# Patient Record
Sex: MALE | Race: BLACK OR AFRICAN AMERICAN | NOT HISPANIC OR LATINO | URBAN - METROPOLITAN AREA
[De-identification: names, ages, dates, MRNs, and addresses within clinical notes are randomized per-mention and may not be internally consistent; named-entity substitution may affect disease eponyms.]

---

## 2020-12-16 ENCOUNTER — INPATIENT (INPATIENT)
Facility: HOSPITAL | Age: 40
LOS: 2 days | Discharge: HOME | End: 2020-12-19
Attending: STUDENT IN AN ORGANIZED HEALTH CARE EDUCATION/TRAINING PROGRAM | Admitting: STUDENT IN AN ORGANIZED HEALTH CARE EDUCATION/TRAINING PROGRAM
Payer: COMMERCIAL

## 2020-12-16 VITALS
RESPIRATION RATE: 17 BRPM | SYSTOLIC BLOOD PRESSURE: 128 MMHG | HEIGHT: 72 IN | DIASTOLIC BLOOD PRESSURE: 81 MMHG | WEIGHT: 220.02 LBS | TEMPERATURE: 99 F | HEART RATE: 86 BPM | OXYGEN SATURATION: 99 %

## 2020-12-16 DIAGNOSIS — Z98.890 OTHER SPECIFIED POSTPROCEDURAL STATES: Chronic | ICD-10-CM

## 2020-12-16 DIAGNOSIS — K50.00 CROHN'S DISEASE OF SMALL INTESTINE WITHOUT COMPLICATIONS: ICD-10-CM

## 2020-12-16 LAB
ALBUMIN SERPL ELPH-MCNC: 3.3 G/DL — SIGNIFICANT CHANGE UP (ref 3.3–5)
ALP SERPL-CCNC: 43 U/L — SIGNIFICANT CHANGE UP (ref 40–120)
ALT FLD-CCNC: 47 U/L — SIGNIFICANT CHANGE UP (ref 12–78)
ANION GAP SERPL CALC-SCNC: 9 MMOL/L — SIGNIFICANT CHANGE UP (ref 5–17)
APPEARANCE UR: CLEAR — SIGNIFICANT CHANGE UP
APTT BLD: 28.6 SEC — SIGNIFICANT CHANGE UP (ref 27.5–35.5)
AST SERPL-CCNC: 61 U/L — HIGH (ref 15–37)
BILIRUB SERPL-MCNC: 0.7 MG/DL — SIGNIFICANT CHANGE UP (ref 0.2–1.2)
BILIRUB UR-MCNC: NEGATIVE — SIGNIFICANT CHANGE UP
BLD GP AB SCN SERPL QL: SIGNIFICANT CHANGE UP
BUN SERPL-MCNC: 13 MG/DL — SIGNIFICANT CHANGE UP (ref 7–23)
CALCIUM SERPL-MCNC: 8.6 MG/DL — SIGNIFICANT CHANGE UP (ref 8.5–10.1)
CHLORIDE SERPL-SCNC: 99 MMOL/L — SIGNIFICANT CHANGE UP (ref 96–108)
CO2 SERPL-SCNC: 25 MMOL/L — SIGNIFICANT CHANGE UP (ref 22–31)
COLOR SPEC: YELLOW — SIGNIFICANT CHANGE UP
CREAT SERPL-MCNC: 1.88 MG/DL — HIGH (ref 0.5–1.3)
DIFF PNL FLD: ABNORMAL
EPI CELLS # UR: SIGNIFICANT CHANGE UP
FLUAV AG NPH QL: SIGNIFICANT CHANGE UP COUNTS
FLUBV AG NPH QL: SIGNIFICANT CHANGE UP COUNTS
GLUCOSE SERPL-MCNC: 129 MG/DL — HIGH (ref 70–99)
GLUCOSE UR QL: NEGATIVE MG/DL — SIGNIFICANT CHANGE UP
HCT VFR BLD CALC: 46.1 % — SIGNIFICANT CHANGE UP (ref 39–50)
HGB BLD-MCNC: 15.2 G/DL — SIGNIFICANT CHANGE UP (ref 13–17)
INR BLD: 1.05 RATIO — SIGNIFICANT CHANGE UP (ref 0.88–1.16)
KETONES UR-MCNC: NEGATIVE — SIGNIFICANT CHANGE UP
LACTATE SERPL-SCNC: 1.1 MMOL/L — SIGNIFICANT CHANGE UP (ref 0.7–2)
LACTATE SERPL-SCNC: 2.2 MMOL/L — HIGH (ref 0.7–2)
LEUKOCYTE ESTERASE UR-ACNC: NEGATIVE — SIGNIFICANT CHANGE UP
MCHC RBC-ENTMCNC: 27 PG — SIGNIFICANT CHANGE UP (ref 27–34)
MCHC RBC-ENTMCNC: 33 GM/DL — SIGNIFICANT CHANGE UP (ref 32–36)
MCV RBC AUTO: 82 FL — SIGNIFICANT CHANGE UP (ref 80–100)
NITRITE UR-MCNC: NEGATIVE — SIGNIFICANT CHANGE UP
NRBC # BLD: 0 /100 WBCS — SIGNIFICANT CHANGE UP (ref 0–0)
PH UR: 7 — SIGNIFICANT CHANGE UP (ref 5–8)
PLATELET # BLD AUTO: 126 K/UL — LOW (ref 150–400)
POTASSIUM SERPL-MCNC: 3.2 MMOL/L — LOW (ref 3.5–5.3)
POTASSIUM SERPL-SCNC: 3.2 MMOL/L — LOW (ref 3.5–5.3)
PROT SERPL-MCNC: 7.7 GM/DL — SIGNIFICANT CHANGE UP (ref 6–8.3)
PROT UR-MCNC: 30 MG/DL
PROTHROM AB SERPL-ACNC: 12.2 SEC — SIGNIFICANT CHANGE UP (ref 10.6–13.6)
RBC # BLD: 5.62 M/UL — SIGNIFICANT CHANGE UP (ref 4.2–5.8)
RBC # FLD: 13.9 % — SIGNIFICANT CHANGE UP (ref 10.3–14.5)
RBC CASTS # UR COMP ASSIST: SIGNIFICANT CHANGE UP /HPF (ref 0–4)
RSV RNA NPH QL NAA+NON-PROBE: SIGNIFICANT CHANGE UP COUNTS
SARS-COV-2 RNA SPEC QL NAA+PROBE: SIGNIFICANT CHANGE UP COUNTS
SODIUM SERPL-SCNC: 133 MMOL/L — LOW (ref 135–145)
SP GR SPEC: 1 — LOW (ref 1.01–1.02)
UROBILINOGEN FLD QL: NEGATIVE MG/DL — SIGNIFICANT CHANGE UP
WBC # BLD: 4.12 K/UL — SIGNIFICANT CHANGE UP (ref 3.8–10.5)
WBC # FLD AUTO: 4.12 K/UL — SIGNIFICANT CHANGE UP (ref 3.8–10.5)

## 2020-12-16 PROCEDURE — 99285 EMERGENCY DEPT VISIT HI MDM: CPT

## 2020-12-16 PROCEDURE — 99221 1ST HOSP IP/OBS SF/LOW 40: CPT

## 2020-12-16 PROCEDURE — 74177 CT ABD & PELVIS W/CONTRAST: CPT | Mod: 26,MA

## 2020-12-16 PROCEDURE — 93010 ELECTROCARDIOGRAM REPORT: CPT

## 2020-12-16 PROCEDURE — 71045 X-RAY EXAM CHEST 1 VIEW: CPT | Mod: 26

## 2020-12-16 RX ORDER — SODIUM CHLORIDE 9 MG/ML
1000 INJECTION INTRAMUSCULAR; INTRAVENOUS; SUBCUTANEOUS
Refills: 0 | Status: DISCONTINUED | OUTPATIENT
Start: 2020-12-16 | End: 2020-12-16

## 2020-12-16 RX ORDER — MORPHINE SULFATE 50 MG/1
4 CAPSULE, EXTENDED RELEASE ORAL ONCE
Refills: 0 | Status: DISCONTINUED | OUTPATIENT
Start: 2020-12-16 | End: 2020-12-16

## 2020-12-16 RX ORDER — HEPARIN SODIUM 5000 [USP'U]/ML
5000 INJECTION INTRAVENOUS; SUBCUTANEOUS EVERY 12 HOURS
Refills: 0 | Status: DISCONTINUED | OUTPATIENT
Start: 2020-12-16 | End: 2020-12-19

## 2020-12-16 RX ORDER — SODIUM CHLORIDE 9 MG/ML
1000 INJECTION INTRAMUSCULAR; INTRAVENOUS; SUBCUTANEOUS ONCE
Refills: 0 | Status: COMPLETED | OUTPATIENT
Start: 2020-12-16 | End: 2020-12-16

## 2020-12-16 RX ORDER — MORPHINE SULFATE 50 MG/1
2 CAPSULE, EXTENDED RELEASE ORAL EVERY 4 HOURS
Refills: 0 | Status: DISCONTINUED | OUTPATIENT
Start: 2020-12-16 | End: 2020-12-17

## 2020-12-16 RX ORDER — CIPROFLOXACIN LACTATE 400MG/40ML
400 VIAL (ML) INTRAVENOUS ONCE
Refills: 0 | Status: COMPLETED | OUTPATIENT
Start: 2020-12-16 | End: 2020-12-16

## 2020-12-16 RX ORDER — METRONIDAZOLE 500 MG
TABLET ORAL
Refills: 0 | Status: DISCONTINUED | OUTPATIENT
Start: 2020-12-16 | End: 2020-12-18

## 2020-12-16 RX ORDER — CIPROFLOXACIN LACTATE 400MG/40ML
400 VIAL (ML) INTRAVENOUS EVERY 12 HOURS
Refills: 0 | Status: DISCONTINUED | OUTPATIENT
Start: 2020-12-17 | End: 2020-12-18

## 2020-12-16 RX ORDER — ONDANSETRON 8 MG/1
4 TABLET, FILM COATED ORAL EVERY 6 HOURS
Refills: 0 | Status: DISCONTINUED | OUTPATIENT
Start: 2020-12-16 | End: 2020-12-19

## 2020-12-16 RX ORDER — PANTOPRAZOLE SODIUM 20 MG/1
40 TABLET, DELAYED RELEASE ORAL DAILY
Refills: 0 | Status: DISCONTINUED | OUTPATIENT
Start: 2020-12-16 | End: 2020-12-19

## 2020-12-16 RX ORDER — METRONIDAZOLE 500 MG
500 TABLET ORAL ONCE
Refills: 0 | Status: COMPLETED | OUTPATIENT
Start: 2020-12-16 | End: 2020-12-16

## 2020-12-16 RX ORDER — MORPHINE SULFATE 50 MG/1
2 CAPSULE, EXTENDED RELEASE ORAL ONCE
Refills: 0 | Status: DISCONTINUED | OUTPATIENT
Start: 2020-12-16 | End: 2020-12-16

## 2020-12-16 RX ORDER — POTASSIUM CHLORIDE 20 MEQ
40 PACKET (EA) ORAL ONCE
Refills: 0 | Status: COMPLETED | OUTPATIENT
Start: 2020-12-16 | End: 2020-12-16

## 2020-12-16 RX ORDER — METRONIDAZOLE 500 MG
500 TABLET ORAL EVERY 8 HOURS
Refills: 0 | Status: DISCONTINUED | OUTPATIENT
Start: 2020-12-16 | End: 2020-12-18

## 2020-12-16 RX ORDER — ACETAMINOPHEN 500 MG
650 TABLET ORAL EVERY 6 HOURS
Refills: 0 | Status: DISCONTINUED | OUTPATIENT
Start: 2020-12-16 | End: 2020-12-17

## 2020-12-16 RX ORDER — PIPERACILLIN AND TAZOBACTAM 4; .5 G/20ML; G/20ML
3.38 INJECTION, POWDER, LYOPHILIZED, FOR SOLUTION INTRAVENOUS ONCE
Refills: 0 | Status: DISCONTINUED | OUTPATIENT
Start: 2020-12-16 | End: 2020-12-16

## 2020-12-16 RX ORDER — CIPROFLOXACIN LACTATE 400MG/40ML
VIAL (ML) INTRAVENOUS
Refills: 0 | Status: DISCONTINUED | OUTPATIENT
Start: 2020-12-16 | End: 2020-12-18

## 2020-12-16 RX ORDER — SODIUM CHLORIDE 9 MG/ML
1000 INJECTION, SOLUTION INTRAVENOUS
Refills: 0 | Status: DISCONTINUED | OUTPATIENT
Start: 2020-12-16 | End: 2020-12-17

## 2020-12-16 RX ADMIN — Medication 100 MILLIGRAM(S): at 21:50

## 2020-12-16 RX ADMIN — Medication 650 MILLIGRAM(S): at 19:50

## 2020-12-16 RX ADMIN — Medication 100 MILLIGRAM(S): at 17:21

## 2020-12-16 RX ADMIN — MORPHINE SULFATE 2 MILLIGRAM(S): 50 CAPSULE, EXTENDED RELEASE ORAL at 16:00

## 2020-12-16 RX ADMIN — MORPHINE SULFATE 2 MILLIGRAM(S): 50 CAPSULE, EXTENDED RELEASE ORAL at 21:50

## 2020-12-16 RX ADMIN — SODIUM CHLORIDE 1000 MILLILITER(S): 9 INJECTION INTRAMUSCULAR; INTRAVENOUS; SUBCUTANEOUS at 13:14

## 2020-12-16 RX ADMIN — Medication 40 MILLIEQUIVALENT(S): at 17:16

## 2020-12-16 RX ADMIN — SODIUM CHLORIDE 1000 MILLILITER(S): 9 INJECTION INTRAMUSCULAR; INTRAVENOUS; SUBCUTANEOUS at 17:17

## 2020-12-16 RX ADMIN — MORPHINE SULFATE 2 MILLIGRAM(S): 50 CAPSULE, EXTENDED RELEASE ORAL at 17:17

## 2020-12-16 RX ADMIN — Medication 200 MILLIGRAM(S): at 18:55

## 2020-12-16 RX ADMIN — HEPARIN SODIUM 5000 UNIT(S): 5000 INJECTION INTRAVENOUS; SUBCUTANEOUS at 18:55

## 2020-12-16 NOTE — H&P ADULT - NSHPSOCIALHISTORY_GEN_ALL_CORE
Denies tobacco or recreational drug use.  Admits to social alcohol use.  Lives alone, has a girlfriend

## 2020-12-16 NOTE — ED PROVIDER NOTE - OBJECTIVE STATEMENT
40 year old male w/no pertinent PMH presents to the ED for abdominal pain and non-bloody diarrhea x2 days. Pt also w/nausea but no vomiting. Pt reports his pain started with periumbilical discomfort, now w/mild RLQ discomfort. Denies fever/chills, cough, SOB, CP, vomiting, dysuria or hematuria.

## 2020-12-16 NOTE — ED PROVIDER NOTE - PROGRESS NOTE DETAILS
CT with questionable colitis/appendicitis. d/w surgery will admit to floor for continued evlauation and treatment.

## 2020-12-16 NOTE — H&P ADULT - PROBLEM SELECTOR PLAN 2
R/o appendicitis, will continue to monitor closely with serial abd exams  Continue IV ABX (Cipro/Flagyl)  Monitor for temps

## 2020-12-16 NOTE — H&P ADULT - PROBLEM SELECTOR PLAN 1
Admit to Dr. Lombardo, Surgery  NPO/IVF  IV fluid resuscitation, rpt lactate   Strict I/Os, monitor and record all urine output  Cipro/Flagyl IV  F/u GI consult (Discussed with Dr. Hong who will see pt in AM)  Monitor for temps, antipyretics PRN  Serial abd exams  Analgesia PRN, antiemetic PRN  DVT ppx, GI ppx  F/u AM labs, ESR, CRP  Replete lytes PRN  Discussed with Dr. Lombardo

## 2020-12-16 NOTE — H&P ADULT - NEGATIVE GENERAL GENITOURINARY SYMPTOMS
no hematuria/no flank pain L/no flank pain R/no bladder infections/no dysuria/normal urinary frequency

## 2020-12-16 NOTE — ED ADULT NURSE NOTE - OBJECTIVE STATEMENT
pt A&Ox4 with  co/ right lower abdominal pain and diarrhea x2 days. Denies n/v, SOB, Denies chest pain. Denies fevers.  Denies PMH

## 2020-12-16 NOTE — H&P ADULT - NSHPPHYSICALEXAM_GEN_ALL_CORE
General: Appears stated age, No acute distress, WD/WN  Head: NC/AT  EENT: PERRLA. EOMI. Conjunctiva and sclera clear. Pharynx clear.  Neck: Supple.  Lungs: CTA B/l. Nonlabored Respirations  CV: +S1S2, RRR  Abdomen: Healed laparoscopic scars noted. Nondistended, +BS, Soft, +moderate RLQ tenderness to palpation with rebound tenderness, no guarding  Extremities: Warm and well perfused. 2+ peripheral pulses b/l. Calf soft, nontender b/l. No pedal edema.  Neuro: A&Ox3.

## 2020-12-16 NOTE — H&P ADULT - HISTORY OF PRESENT ILLNESS
41yo Male with no sig. PMH and h/o Laparoscopic Umbilical hernia repair with mesh (3yrs ago) presents to the ER c/o abdominal pain and diarrhea x3 days. Patient states RLQ abdominal pain and diarrhea began 3 days ago after eating a medium-rare steak and drinking "more alcohol than the usual" the night before. States pain has been severe and intermittent since it started, non radiating, rated as 9/10 in severity at its worst, feels better when he lays down, has not taken anything for pain at home. Associated with nausea, and anorexia, denies vomiting. Has been drinking Gatorade x3 days, unable to eat. Also c/o watery nonbloody diarrhea x3 days (about 15-20 episodes/day). Denies h/o similar incident, states he thought he had food poisoning, but pain and diarrhea has not improved prompting his arrival to the ER. States he took a COVID test which was negative.   Denies fevers, chest pain, sob, dizziness, dysuria, h/o constipation, Crohn's or UC.

## 2020-12-16 NOTE — ED PROVIDER NOTE - CLINICAL SUMMARY MEDICAL DECISION MAKING FREE TEXT BOX
Will evaluate for appendicitis and other causes of intraabdominal pathology with labs, urine ant CT abdomen/pelvis pt declining pain medication at this time.

## 2020-12-17 LAB
ANION GAP SERPL CALC-SCNC: 8 MMOL/L — SIGNIFICANT CHANGE UP (ref 5–17)
BUN SERPL-MCNC: 12 MG/DL — SIGNIFICANT CHANGE UP (ref 7–23)
CALCIUM SERPL-MCNC: 8.4 MG/DL — LOW (ref 8.5–10.1)
CHLORIDE SERPL-SCNC: 102 MMOL/L — SIGNIFICANT CHANGE UP (ref 96–108)
CO2 SERPL-SCNC: 27 MMOL/L — SIGNIFICANT CHANGE UP (ref 22–31)
CREAT SERPL-MCNC: 1.66 MG/DL — HIGH (ref 0.5–1.3)
CRP SERPL-MCNC: 21.64 MG/DL — HIGH (ref 0–0.4)
ERYTHROCYTE [SEDIMENTATION RATE] IN BLOOD: 17 MM/HR — HIGH (ref 0–15)
GLUCOSE SERPL-MCNC: 95 MG/DL — SIGNIFICANT CHANGE UP (ref 70–99)
HCT VFR BLD CALC: 44.3 % — SIGNIFICANT CHANGE UP (ref 39–50)
HGB BLD-MCNC: 14.7 G/DL — SIGNIFICANT CHANGE UP (ref 13–17)
MAGNESIUM SERPL-MCNC: 1.8 MG/DL — SIGNIFICANT CHANGE UP (ref 1.6–2.6)
MCHC RBC-ENTMCNC: 27.2 PG — SIGNIFICANT CHANGE UP (ref 27–34)
MCHC RBC-ENTMCNC: 33.2 GM/DL — SIGNIFICANT CHANGE UP (ref 32–36)
MCV RBC AUTO: 82 FL — SIGNIFICANT CHANGE UP (ref 80–100)
NRBC # BLD: 0 /100 WBCS — SIGNIFICANT CHANGE UP (ref 0–0)
PHOSPHATE SERPL-MCNC: 2.6 MG/DL — SIGNIFICANT CHANGE UP (ref 2.5–4.5)
PLATELET # BLD AUTO: 134 K/UL — LOW (ref 150–400)
POTASSIUM SERPL-MCNC: 3.4 MMOL/L — LOW (ref 3.5–5.3)
POTASSIUM SERPL-SCNC: 3.4 MMOL/L — LOW (ref 3.5–5.3)
RBC # BLD: 5.4 M/UL — SIGNIFICANT CHANGE UP (ref 4.2–5.8)
RBC # FLD: 14.1 % — SIGNIFICANT CHANGE UP (ref 10.3–14.5)
SARS-COV-2 IGG SERPL QL IA: NEGATIVE — SIGNIFICANT CHANGE UP
SARS-COV-2 IGM SERPL IA-ACNC: <0.1 INDEX — SIGNIFICANT CHANGE UP
SODIUM SERPL-SCNC: 137 MMOL/L — SIGNIFICANT CHANGE UP (ref 135–145)
WBC # BLD: 3.71 K/UL — LOW (ref 3.8–10.5)
WBC # FLD AUTO: 3.71 K/UL — LOW (ref 3.8–10.5)

## 2020-12-17 RX ORDER — DEXTROSE MONOHYDRATE, SODIUM CHLORIDE, AND POTASSIUM CHLORIDE 50; .745; 4.5 G/1000ML; G/1000ML; G/1000ML
1000 INJECTION, SOLUTION INTRAVENOUS
Refills: 0 | Status: DISCONTINUED | OUTPATIENT
Start: 2020-12-17 | End: 2020-12-19

## 2020-12-17 RX ORDER — POTASSIUM CHLORIDE 20 MEQ
10 PACKET (EA) ORAL
Refills: 0 | Status: COMPLETED | OUTPATIENT
Start: 2020-12-17 | End: 2020-12-17

## 2020-12-17 RX ORDER — ACETAMINOPHEN 500 MG
650 TABLET ORAL EVERY 6 HOURS
Refills: 0 | Status: DISCONTINUED | OUTPATIENT
Start: 2020-12-17 | End: 2020-12-19

## 2020-12-17 RX ORDER — ACETAMINOPHEN 500 MG
1000 TABLET ORAL ONCE
Refills: 0 | Status: COMPLETED | OUTPATIENT
Start: 2020-12-17 | End: 2020-12-17

## 2020-12-17 RX ADMIN — Medication 1000 MILLIGRAM(S): at 21:39

## 2020-12-17 RX ADMIN — DEXTROSE MONOHYDRATE, SODIUM CHLORIDE, AND POTASSIUM CHLORIDE 75 MILLILITER(S): 50; .745; 4.5 INJECTION, SOLUTION INTRAVENOUS at 13:14

## 2020-12-17 RX ADMIN — Medication 100 MILLIGRAM(S): at 21:09

## 2020-12-17 RX ADMIN — MORPHINE SULFATE 2 MILLIGRAM(S): 50 CAPSULE, EXTENDED RELEASE ORAL at 00:26

## 2020-12-17 RX ADMIN — Medication 100 MILLIGRAM(S): at 13:14

## 2020-12-17 RX ADMIN — Medication 100 MILLIGRAM(S): at 05:08

## 2020-12-17 RX ADMIN — Medication 100 MILLIEQUIVALENT(S): at 08:54

## 2020-12-17 RX ADMIN — MORPHINE SULFATE 2 MILLIGRAM(S): 50 CAPSULE, EXTENDED RELEASE ORAL at 14:45

## 2020-12-17 RX ADMIN — Medication 400 MILLIGRAM(S): at 21:09

## 2020-12-17 RX ADMIN — Medication 650 MILLIGRAM(S): at 05:32

## 2020-12-17 RX ADMIN — SODIUM CHLORIDE 125 MILLILITER(S): 9 INJECTION, SOLUTION INTRAVENOUS at 05:08

## 2020-12-17 RX ADMIN — PANTOPRAZOLE SODIUM 40 MILLIGRAM(S): 20 TABLET, DELAYED RELEASE ORAL at 11:05

## 2020-12-17 RX ADMIN — Medication 200 MILLIGRAM(S): at 17:01

## 2020-12-17 RX ADMIN — MORPHINE SULFATE 2 MILLIGRAM(S): 50 CAPSULE, EXTENDED RELEASE ORAL at 14:25

## 2020-12-17 RX ADMIN — Medication 100 MILLIEQUIVALENT(S): at 10:01

## 2020-12-17 RX ADMIN — Medication 200 MILLIGRAM(S): at 05:07

## 2020-12-17 RX ADMIN — Medication 100 MILLIEQUIVALENT(S): at 08:05

## 2020-12-17 RX ADMIN — MORPHINE SULFATE 2 MILLIGRAM(S): 50 CAPSULE, EXTENDED RELEASE ORAL at 05:07

## 2020-12-17 RX ADMIN — HEPARIN SODIUM 5000 UNIT(S): 5000 INJECTION INTRAVENOUS; SUBCUTANEOUS at 17:01

## 2020-12-17 NOTE — CONSULT NOTE ADULT - SUBJECTIVE AND OBJECTIVE BOX
HPI:  39yo Male with no sig. PMH and h/o Laparoscopic Umbilical hernia repair with mesh (3yrs ago) presents to the ER c/o abdominal pain and diarrhea x3 days. Patient states RLQ abdominal pain and diarrhea began 3 days ago after eating a medium-rare steak and drinking "more alcohol than the usual" the night before. States pain has been severe and intermittent since it started, non radiating, rated as 9/10 in severity at its worst, feels better when he lays down, has not taken anything for pain at home. Associated with nausea, and anorexia, denies vomiting. Has been drinking Gatorade x3 days, unable to eat. Also c/o watery nonbloody diarrhea x3 days (about 15-20 episodes/day). Denies h/o similar incident, states he thought he had food poisoning, but pain and diarrhea has not improved prompting his arrival to the ER. States he took a COVID test which was negative.   Denies fevers, chest pain, sob, dizziness, dysuria, h/o constipation, Crohn's or UC.  (16 Dec 2020 17:09)  --------------- As Above -------------  The patient was doing well until Monday ~ 1AM when he developed diarrhea. It worsened and he then developed RLQ pain. Symptoms worsened and he came to the ER. Symptoms started abruptly. Was in [place] 2 weeks ago. No other friends / family members with above symptoms. No family history of IBDNo recent antibiotics.  Prior to Monday,  the patient denies melena, hematochezia, hematemesis, nausea, vomiting, abdominal pain, constipation, diarrhea, or change in bowel movements   Feeling slightly better this AM. Diarrhea unchanged / pain better. febrile (+)  See CT / labs      PAST MEDICAL & SURGICAL HISTORY:  No pertinent past medical history    History of umbilical hernia repair  Laparoscopic, with mesh        MEDICATIONS  (STANDING):  ciprofloxacin   IVPB 400 milliGRAM(s) IV Intermittent every 12 hours  ciprofloxacin   IVPB      dextrose 5% + sodium chloride 0.45% with potassium chloride 20 mEq/L 1000 milliLiter(s) (75 mL/Hr) IV Continuous <Continuous>  heparin   Injectable 5000 Unit(s) SubCutaneous every 12 hours  metroNIDAZOLE  IVPB      metroNIDAZOLE  IVPB 500 milliGRAM(s) IV Intermittent every 8 hours  pantoprazole  Injectable 40 milliGRAM(s) IV Push daily    MEDICATIONS  (PRN):  acetaminophen   Tablet .. 650 milliGRAM(s) Oral every 6 hours PRN Temp greater or equal to 38C (100.4F), Mild Pain (1 - 3)  morphine  - Injectable 2 milliGRAM(s) IV Push every 4 hours PRN Severe Pain (7 - 10)  ondansetron Injectable 4 milliGRAM(s) IV Push every 6 hours PRN Nausea and/or Vomiting      Allergies    No Known Allergies    Intolerances        FAMILY HISTORY:  No pertinent family history in first degree relatives        REVIEW OF SYSTEMS:    CONSTITUTIONAL: No fever, weight loss, or fatigue  EYES: No eye pain, visual disturbances, or discharge  ENMT:  No difficulty hearing, tinnitus, vertigo; No sinus or throat pain  NECK: No pain or stiffness  BREASTS: No pain, masses, or nipple discharge  RESPIRATORY: No cough, wheezing, chills or hemoptysis; No shortness of breath  CARDIOVASCULAR: No chest pain, palpitations, dizziness, or leg swelling  GASTROINTESTINAL: See above  GENITOURINARY: No dysuria, frequency, hematuria, or incontinence  NEUROLOGICAL: No headaches, memory loss, loss of strength, numbness, or tremors  SKIN: No itching, burning, rashes, or lesions   LYMPH NODES: No enlarged glands  ENDOCRINE: No heat or cold intolerance; No hair loss  MUSCULOSKELETAL: No joint pain or swelling; No muscle, back, or extremity pain  PSYCHIATRIC: No depression, anxiety, mood swings, or difficulty sleeping  HEME/LYMPH: No easy bruising, or bleeding gums  ALLERGY AND IMMUNOLOGIC: No hives or eczema          SOCIAL HISTORY:    FAMILY HISTORY:  No pertinent family history in first degree relatives        Vital Signs Last 24 Hrs  T(C): 36.9 (17 Dec 2020 11:44), Max: 38.4 (16 Dec 2020 19:49)  T(F): 98.5 (17 Dec 2020 11:44), Max: 101.2 (16 Dec 2020 19:49)  HR: 59 (17 Dec 2020 11:44) (58 - 100)  BP: 111/62 (17 Dec 2020 11:44) (107/64 - 117/57)  BP(mean): --  RR: 18 (17 Dec 2020 11:44) (17 - 18)  SpO2: 98% (17 Dec 2020 11:44) (96% - 99%)    PHYSICAL EXAM:    GENERAL: NAD, well-groomed, well-developed  HEAD:  Atraumatic, Normocephalic  EYES: EOMI, PERRLA, conjunctiva and sclera clear  NECK: Supple, No JVD, Normal thyroid  NERVOUS SYSTEM:  Alert & Oriented X3, Good concentration; Motor Strength 5/5 B/L upper and lower extremities;   CHEST/LUNG: Clear to percussion bilaterally; No rales, rhonchi, wheezing, or rubs  HEART: Regular rate and rhythm; No murmurs, rubs, or gallops  ABDOMEN: Soft, Nontender, Nondistended; Bowel sounds present  EXTREMITIES:  2+ Peripheral Pulses, No clubbing, cyanosis, or edema  LYMPH: No lymphadenopathy noted   RECTAL:  Deferred   SKIN: No rashes or lesions    LABS:                        14.7   3.71  )-----------( 134      ( 17 Dec 2020 04:36 )             44.3       CBC:  - @ 04:36  WBC  3.71  HGB 14.7  HCT 44.3 Plate 134  MCV 82.0  12- @ 13:10  WBC  4.12  HGB 15.2  HCT 46.1 Plate 126  MCV 82.0           17 Dec 2020 04:36    137    |  102    |  12     ----------------------------<  95     3.4     |  27     |  1.66   16 Dec 2020 13:10    133    |  99     |  13     ----------------------------<  129    3.2     |  25     |  1.88     Ca    8.4        17 Dec 2020 04:36  Ca    8.6        16 Dec 2020 13:10  Phos  2.6       17 Dec 2020 04:36  Mg     1.8       17 Dec 2020 04:36    TPro  7.7    /  Alb  3.3    /  TBili  0.7    /  DBili  x      /  AST  61     /  ALT  47     /  AlkPhos  43     16 Dec 2020 13:10    PT/INR - ( 16 Dec 2020 13:10 )   PT: 12.2 sec;   INR: 1.05 ratio         PTT - ( 16 Dec 2020 13:10 )  PTT:28.6 sec  Urinalysis Basic - ( 16 Dec 2020 16:49 )    Color: Yellow / Appearance: Clear / S.005 / pH: x  Gluc: x / Ketone: Negative  / Bili: Negative / Urobili: Negative mg/dL   Blood: x / Protein: 30 mg/dL / Nitrite: Negative   Leuk Esterase: Negative / RBC: 0-2 /HPF / WBC x   Sq Epi: x / Non Sq Epi: Few / Bacteria: x          RADIOLOGY & ADDITIONAL STUDIES:  < from: CT Abdomen and Pelvis w/ IV Cont (20 @ 15:53) >    EXAM:  CT ABDOMEN AND PELVIS IC                            PROCEDURE DATE:  2020          INTERPRETATION:  CLINICAL INDICATION: 40 years  Male with abdominal pain.    COMPARISON: None.    PROCEDURE:  CT of the Abdomen and Pelvis was performedwith intravenous contrast.  Intravenous contrast: 85 ml Omnipaque 350. 15 ml discarded.  Oral contrast: None.  Sagittal and coronal reformats were performed.    LIMITATION: Absence of enteric contrast limits evaluation of the GI tract.    FINDINGS:  LOWER CHEST: Minimal bilateral lower lobe dependent atelectasis.    LIVER: Within normal limits.  BILE DUCTS: Normal caliber.  GALLBLADDER: Within normal limits.  SPLEEN: Within normal limits.  PANCREAS: Within normal limits.  ADRENALS: Within normal limits.  KIDNEYS/URETERS: Within normal limits.    BLADDER: Within normal limits.  REPRODUCTIVE ORGANS: Normal prostate.    BOWEL: No bowel obstruction. Markedly thickened terminal ileum and proximal ascending colon. The colon is fluid-filled without distention of proximal and mid appendix appear normal. There is fat stranding around the appendiceal tip, which may be reactive secondary to the adjacent terminal ileitis or may reflect tip appendicitis.  PERITONEUM: Small pelvic ascites  VESSELS: Within normal limits.  RETROPERITONEUM/LYMPH NODES: No lymphadenopathy.  ABDOMINAL WALL: Within normal limits.  BONES: Within normal limits.    IMPRESSION:  Markedly thickened terminal ileum the right colon. Rule out Crohn's disease or other colitis.    Inflammatory stranding around the tip of the appendix may be reactive secondary to the terminal ileitis or may reflect tip appendicitis.    Findings were discussed with Dr. SHA BLACKMAN [phone] 2020 4:32 PM by Dr. Fitzpatrick with read back confirmation.            DANIEL FITZPATRICK MD; Attending Radiologist  This document has been electronically signed. Dec 16 2020  4:33PM    < end of copied text >

## 2020-12-17 NOTE — CONSULT NOTE ADULT - ASSESSMENT
HPI:  39yo Male with no sig. PMH and h/o Laparoscopic Umbilical hernia repair with mesh (3yrs ago) presents to the ER c/o abdominal pain and diarrhea x3 days. Patient states RLQ abdominal pain and diarrhea began 3 days ago after eating a medium-rare steak and drinking "more alcohol than the usual" the night before. States pain has been severe and intermittent since it started, non radiating, rated as 9/10 in severity at its worst, feels better when he lays down, has not taken anything for pain at home. Associated with nausea, and anorexia, denies vomiting. Has been drinking Gatorade x3 days, unable to eat. Also c/o watery nonbloody diarrhea x3 days (about 15-20 episodes/day). Denies h/o similar incident, states he thought he had food poisoning, but pain and diarrhea has not improved prompting his arrival to the ER. States he took a COVID test which was negative.   Denies fevers, chest pain, sob, dizziness, dysuria, h/o constipation, Crohn's or UC.  (16 Dec 2020 17:09)  --------------- As Above -------------  The patient was doing well until Monday ~ 1AM when he developed diarrhea. It worsened and he then developed RLQ pain. Symptoms worsened and he came to the ER. Symptoms started abruptly. Was in [place] 2 weeks ago. No other friends / family members with above symptoms. No family history of IBD No recent antibiotics.  Prior to Monday,  the patient denies melena, hematochezia, hematemesis, nausea, vomiting, abdominal pain, constipation, diarrhea, or change in bowel movements   Feeling slightly better this AM. Diarrhea unchanged / pain better. febrile (+)  See CT / labs    Enteritis r/o appendicitis - probably infectious, doubt IBD / neoplasm - 1) NPO / IV fluids 2) stool studies  3) continue antibiotics 4) CRP  5) No Colonoscopy at present time 6) surgical f/u

## 2020-12-18 ENCOUNTER — TRANSCRIPTION ENCOUNTER (OUTPATIENT)
Age: 40
End: 2020-12-18

## 2020-12-18 LAB
ANION GAP SERPL CALC-SCNC: 8 MMOL/L — SIGNIFICANT CHANGE UP (ref 5–17)
BUN SERPL-MCNC: 9 MG/DL — SIGNIFICANT CHANGE UP (ref 7–23)
C DIFF BY PCR RESULT: SIGNIFICANT CHANGE UP
C DIFF TOX GENS STL QL NAA+PROBE: SIGNIFICANT CHANGE UP
CALCIUM SERPL-MCNC: 9.1 MG/DL — SIGNIFICANT CHANGE UP (ref 8.5–10.1)
CHLORIDE SERPL-SCNC: 100 MMOL/L — SIGNIFICANT CHANGE UP (ref 96–108)
CO2 SERPL-SCNC: 25 MMOL/L — SIGNIFICANT CHANGE UP (ref 22–31)
CREAT SERPL-MCNC: 1.57 MG/DL — HIGH (ref 0.5–1.3)
CRP SERPL-MCNC: 17.91 MG/DL — HIGH (ref 0–0.4)
CULTURE RESULTS: SIGNIFICANT CHANGE UP
ERYTHROCYTE [SEDIMENTATION RATE] IN BLOOD: 23 MM/HR — HIGH (ref 0–15)
GLUCOSE SERPL-MCNC: 81 MG/DL — SIGNIFICANT CHANGE UP (ref 70–99)
HCT VFR BLD CALC: 44.1 % — SIGNIFICANT CHANGE UP (ref 39–50)
HGB BLD-MCNC: 14.5 G/DL — SIGNIFICANT CHANGE UP (ref 13–17)
MAGNESIUM SERPL-MCNC: 2.1 MG/DL — SIGNIFICANT CHANGE UP (ref 1.6–2.6)
MCHC RBC-ENTMCNC: 26.9 PG — LOW (ref 27–34)
MCHC RBC-ENTMCNC: 32.9 GM/DL — SIGNIFICANT CHANGE UP (ref 32–36)
MCV RBC AUTO: 81.8 FL — SIGNIFICANT CHANGE UP (ref 80–100)
NRBC # BLD: 0 /100 WBCS — SIGNIFICANT CHANGE UP (ref 0–0)
PHOSPHATE SERPL-MCNC: 2.5 MG/DL — SIGNIFICANT CHANGE UP (ref 2.5–4.5)
PLATELET # BLD AUTO: 170 K/UL — SIGNIFICANT CHANGE UP (ref 150–400)
POTASSIUM SERPL-MCNC: 3.3 MMOL/L — LOW (ref 3.5–5.3)
POTASSIUM SERPL-SCNC: 3.3 MMOL/L — LOW (ref 3.5–5.3)
RBC # BLD: 5.39 M/UL — SIGNIFICANT CHANGE UP (ref 4.2–5.8)
RBC # FLD: 14.3 % — SIGNIFICANT CHANGE UP (ref 10.3–14.5)
SODIUM SERPL-SCNC: 133 MMOL/L — LOW (ref 135–145)
SPECIMEN SOURCE: SIGNIFICANT CHANGE UP
WBC # BLD: 3.82 K/UL — SIGNIFICANT CHANGE UP (ref 3.8–10.5)
WBC # FLD AUTO: 3.82 K/UL — SIGNIFICANT CHANGE UP (ref 3.8–10.5)

## 2020-12-18 RX ORDER — METRONIDAZOLE 500 MG
500 TABLET ORAL EVERY 8 HOURS
Refills: 0 | Status: DISCONTINUED | OUTPATIENT
Start: 2020-12-18 | End: 2020-12-18

## 2020-12-18 RX ORDER — POTASSIUM CHLORIDE 20 MEQ
40 PACKET (EA) ORAL EVERY 4 HOURS
Refills: 0 | Status: COMPLETED | OUTPATIENT
Start: 2020-12-18 | End: 2020-12-18

## 2020-12-18 RX ORDER — BACILLUS COAGULANS/INULIN 1B-250 MG
1 CAPSULE ORAL
Qty: 30 | Refills: 0
Start: 2020-12-18 | End: 2021-01-16

## 2020-12-18 RX ORDER — CIPROFLOXACIN LACTATE 400MG/40ML
500 VIAL (ML) INTRAVENOUS EVERY 12 HOURS
Refills: 0 | Status: DISCONTINUED | OUTPATIENT
Start: 2020-12-18 | End: 2020-12-19

## 2020-12-18 RX ORDER — MOXIFLOXACIN HYDROCHLORIDE TABLETS, 400 MG 400 MG/1
1 TABLET, FILM COATED ORAL
Qty: 6 | Refills: 0
Start: 2020-12-18 | End: 2020-12-20

## 2020-12-18 RX ORDER — SODIUM CHLORIDE 9 MG/ML
500 INJECTION INTRAMUSCULAR; INTRAVENOUS; SUBCUTANEOUS ONCE
Refills: 0 | Status: COMPLETED | OUTPATIENT
Start: 2020-12-18 | End: 2020-12-18

## 2020-12-18 RX ADMIN — Medication 650 MILLIGRAM(S): at 01:05

## 2020-12-18 RX ADMIN — SODIUM CHLORIDE 1000 MILLILITER(S): 9 INJECTION INTRAMUSCULAR; INTRAVENOUS; SUBCUTANEOUS at 09:59

## 2020-12-18 RX ADMIN — Medication 40 MILLIEQUIVALENT(S): at 09:58

## 2020-12-18 RX ADMIN — Medication 100 MILLIGRAM(S): at 05:23

## 2020-12-18 RX ADMIN — PANTOPRAZOLE SODIUM 40 MILLIGRAM(S): 20 TABLET, DELAYED RELEASE ORAL at 11:08

## 2020-12-18 RX ADMIN — Medication 650 MILLIGRAM(S): at 01:35

## 2020-12-18 RX ADMIN — Medication 500 MILLIGRAM(S): at 17:04

## 2020-12-18 RX ADMIN — Medication 200 MILLIGRAM(S): at 05:24

## 2020-12-18 RX ADMIN — Medication 40 MILLIEQUIVALENT(S): at 13:04

## 2020-12-18 RX ADMIN — HEPARIN SODIUM 5000 UNIT(S): 5000 INJECTION INTRAVENOUS; SUBCUTANEOUS at 17:05

## 2020-12-18 RX ADMIN — HEPARIN SODIUM 5000 UNIT(S): 5000 INJECTION INTRAVENOUS; SUBCUTANEOUS at 05:24

## 2020-12-18 RX ADMIN — Medication 650 MILLIGRAM(S): at 05:24

## 2020-12-18 RX ADMIN — Medication 650 MILLIGRAM(S): at 05:30

## 2020-12-18 RX ADMIN — Medication 650 MILLIGRAM(S): at 17:05

## 2020-12-18 RX ADMIN — Medication 650 MILLIGRAM(S): at 17:04

## 2020-12-18 NOTE — DISCHARGE NOTE PROVIDER - NSDCMRMEDTOKEN_GEN_ALL_CORE_FT
Probiotic Formula (Bacillus Coagulans) oral capsule: 1 cap(s) orally once a day    Cipro 500 mg oral tablet: 1 tab(s) orally 2 times a day   Probiotic Formula (Bacillus Coagulans) oral capsule: 1 cap(s) orally once a day

## 2020-12-18 NOTE — DISCHARGE NOTE PROVIDER - CARE PROVIDER_API CALL
[place] [address]  Phone: [phone]  Fax: [phone]  Follow Up Time: 1 week

## 2020-12-18 NOTE — DISCHARGE NOTE PROVIDER - NSDCFUADDINST_GEN_ALL_CORE_FT
Please take prescribed antibiotic twice a day for 3 days. Take prescribed probiotic once a day for 30 days.

## 2020-12-18 NOTE — PROGRESS NOTE ADULT - ASSESSMENT
HPI:  41yo Male with no sig. PMH and h/o Laparoscopic Umbilical hernia repair with mesh (3yrs ago) presents to the ER c/o abdominal pain and diarrhea x3 days. Patient states RLQ abdominal pain and diarrhea began 3 days ago after eating a medium-rare steak and drinking "more alcohol than the usual" the night before. States pain has been severe and intermittent since it started, non radiating, rated as 9/10 in severity at its worst, feels better when he lays down, has not taken anything for pain at home. Associated with nausea, and anorexia, denies vomiting. Has been drinking Gatorade x3 days, unable to eat. Also c/o watery nonbloody diarrhea x3 days (about 15-20 episodes/day). Denies h/o similar incident, states he thought he had food poisoning, but pain and diarrhea has not improved prompting his arrival to the ER. States he took a COVID test which was negative.   Denies fevers, chest pain, sob, dizziness, dysuria, h/o constipation, Crohn's or UC.  (16 Dec 2020 17:09)  --------------- As Above -------------  The patient was doing well until Monday ~ 1AM when he developed diarrhea. It worsened and he then developed RLQ pain. Symptoms worsened and he came to the ER. Symptoms started abruptly. Was in [place] 2 weeks ago. No other friends / family members with above symptoms. No family history of IBD No recent antibiotics.  Prior to Monday,  the patient denies melena, hematochezia, hematemesis, nausea, vomiting, abdominal pain, constipation, diarrhea, or change in bowel movements   Feeling slightly better this AM. Diarrhea unchanged / pain better. febrile (+)  See CT / labs    Enteritis r/o appendicitis - probably infectious, doubt IBD / neoplasm - 1) NPO / IV fluids 2) stool studies  3) continue antibiotics 4) CRP  5) No Colonoscopy at present time 6) surgical f/u    [id number]  ---------  Abdominal pain better, no improvement regarding diarrhea -  CRP HPI:  41yo Male with no sig. PMH and h/o Laparoscopic Umbilical hernia repair with mesh (3yrs ago) presents to the ER c/o abdominal pain and diarrhea x3 days. Patient states RLQ abdominal pain and diarrhea began 3 days ago after eating a medium-rare steak and drinking "more alcohol than the usual" the night before. States pain has been severe and intermittent since it started, non radiating, rated as 9/10 in severity at its worst, feels better when he lays down, has not taken anything for pain at home. Associated with nausea, and anorexia, denies vomiting. Has been drinking Gatorade x3 days, unable to eat. Also c/o watery nonbloody diarrhea x3 days (about 15-20 episodes/day). Denies h/o similar incident, states he thought he had food poisoning, but pain and diarrhea has not improved prompting his arrival to the ER. States he took a COVID test which was negative.   Denies fevers, chest pain, sob, dizziness, dysuria, h/o constipation, Crohn's or UC.  (16 Dec 2020 17:09)  --------------- As Above -------------  The patient was doing well until Monday ~ 1AM when he developed diarrhea. It worsened and he then developed RLQ pain. Symptoms worsened and he came to the ER. Symptoms started abruptly. Was in [place] 2 weeks ago. No other friends / family members with above symptoms. No family history of IBD No recent antibiotics.  Prior to Monday,  the patient denies melena, hematochezia, hematemesis, nausea, vomiting, abdominal pain, constipation, diarrhea, or change in bowel movements   Feeling slightly better this AM. Diarrhea unchanged / pain better. febrile (+)  See CT / labs    Enteritis r/o appendicitis - probably infectious, doubt IBD / neoplasm - 1) NPO / IV fluids 2) stool studies  3) continue antibiotics 4) CRP  5) No Colonoscopy at present time 6) surgical f/u    [id number]  ---------  Abdominal pain better, no improvement regarding diarrhea -  CRP 21.64--> 17.91 PCR + for Salmonella. Would continue antibiotics since patient > 15 BMs, had high fever and shows very slow improvement. Cleared from GI for discharge as long as patient tolerates solid diet and shows significant improvement regarding diarrhea . Discussed with PA

## 2020-12-18 NOTE — PROGRESS NOTE ADULT - SUBJECTIVE AND OBJECTIVE BOX
Patient seen and examined at bedside resting comfortably.  States his pain is better but has not completely resolved.  Tolerating CLD. +flatus/ continues to have diarrhea.  Denies fever, chills, N/V, CP, SOB, dizziness, cough.     Vital Signs Last 24 Hrs  T(F): 97.9 (12-18-20 @ 05:23), Max: 98.8 (12-17-20 @ 17:03)  HR: 55 (12-18-20 @ 05:23)  BP: 101/61 (12-18-20 @ 05:23)  RR: 18 (12-18-20 @ 05:23)  SpO2: 99% (12-18-20 @ 05:23)      PHYSICAL EXAM:  GENERAL: Alert, NAD  CHEST/LUNG: respirations nonlabored  HEART: Regular rate and rhythm; S1 & S2 appreciated  ABDOMEN: soft, nontender, nondistended.   EXTREMITIES: no calf tenderness, no edema    I&O's Detail    17 Dec 2020 07:01  -  18 Dec 2020 07:00  --------------------------------------------------------  IN:    dextrose 5% + sodium chloride 0.45% w/ Additives: 1500 mL    IV PiggyBack: 300 mL    IV PiggyBack: 200 mL    Lactated Ringers: 500 mL    Oral Fluid: 680 mL  Total IN: 3180 mL    OUT:    Voided (mL): 200 mL  Total OUT: 200 mL  Total NET: 2980 mL    LABS:                        14.5   3.82  )-----------( 170      ( 18 Dec 2020 07:52 )             44.1     12-18    133<L>  |  100  |  9   ----------------------------<  81  3.3<L>   |  25  |  1.57<H>    Ca    9.1      18 Dec 2020 07:52  Phos  2.5     12-18  Mg     2.1     12-18    TPro  7.7  /  Alb  3.3  /  TBili  0.7  /  DBili  x   /  AST  61<H>  /  ALT  47  /  AlkPhos  43  12-16    PT/INR - ( 16 Dec 2020 13:10 )   PT: 12.2 sec;   INR: 1.05 ratio    PTT - ( 16 Dec 2020 13:10 )  PTT:28.6 sec    ASSESSMENT  39yo M with h/o Laparoscopic umbilical hernia rpr with mesh (3yrs ago) and no sig. PMH presents c/o RLQ abdominal pain and diarrhea x3 days. Admitted with Terminal Ileitis with inflammatory stranding around the tip of the appendix which may be reactive vs tip appendicitis. SUZETTE, hypokalemic and hyponatremic.  Stool cx: salmonella; Cdiff: negative    PLAN  - on cipro/flagyl; will consider d/c abx as salmonella usually requires supportive care/no abx in healthy individuals  - advanced to low residue diet; continue IVF  - sodium and potassium repleted; replete lytes PRN  - strict I/Os, monitor and record all urine output  - ESR: 23  - serial abd exams  - analgesia PRN, antiemetic PRN  - antipyretics PRN  - DVT ppx, GI ppx  - as per Dr. Hong's recs, no colonoscopy at present time  - will discuss with Dr. Lombardo. 
Patient is a 40y old  Male who presents with a chief complaint of Abdominal pain and diarrhea x3 days (18 Dec 2020 10:45)      HPI:  39yo Male with no sig. PMH and h/o Laparoscopic Umbilical hernia repair with mesh (3yrs ago) presents to the ER c/o abdominal pain and diarrhea x3 days. Patient states RLQ abdominal pain and diarrhea began 3 days ago after eating a medium-rare steak and drinking "more alcohol than the usual" the night before. States pain has been severe and intermittent since it started, non radiating, rated as 9/10 in severity at its worst, feels better when he lays down, has not taken anything for pain at home. Associated with nausea, and anorexia, denies vomiting. Has been drinking Gatorade x3 days, unable to eat. Also c/o watery nonbloody diarrhea x3 days (about 15-20 episodes/day). Denies h/o similar incident, states he thought he had food poisoning, but pain and diarrhea has not improved prompting his arrival to the ER. States he took a COVID test which was negative.   Denies fevers, chest pain, sob, dizziness, dysuria, h/o constipation, Crohn's or UC.  (16 Dec 2020 17:09)      INTERVAL HPI/OVERNIGHT EVENTS: Patient seen earlier today.  Pain better but still had significant diarrhea over night. Tolerated clear liquid diet. Afebrile.     MEDICATIONS  (STANDING):  acetaminophen   Tablet .. 650 milliGRAM(s) Oral every 6 hours  dextrose 5% + sodium chloride 0.45% with potassium chloride 20 mEq/L 1000 milliLiter(s) (75 mL/Hr) IV Continuous <Continuous>  heparin   Injectable 5000 Unit(s) SubCutaneous every 12 hours  pantoprazole  Injectable 40 milliGRAM(s) IV Push daily    MEDICATIONS  (PRN):  ondansetron Injectable 4 milliGRAM(s) IV Push every 6 hours PRN Nausea and/or Vomiting      FAMILY HISTORY:  No pertinent family history in first degree relatives        Allergies    No Known Allergies    Intolerances        PMH/PSH:  No pertinent past medical history    History of umbilical hernia repair    No significant past surgical history          REVIEW OF SYSTEMS:  CONSTITUTIONAL: No fever, weight loss,   EYES: No eye pain, visual disturbances, or discharge  ENMT:  No difficulty hearing, tinnitus, vertigo; No sinus or throat pain  NECK: No pain or stiffness  BREASTS: No pain, masses, or nipple discharge  RESPIRATORY: No cough, wheezing, chills or hemoptysis; No shortness of breath  CARDIOVASCULAR: No chest pain, palpitations, dizziness, or leg swelling  GASTROINTESTINAL:  See above  GENITOURINARY: No dysuria, frequency, hematuria, or incontinence  NEUROLOGICAL: No headaches, memory loss, loss of strength, numbness, or tremors  SKIN: No itching, burning, rashes, or lesions   LYMPH NODES: No enlarged glands  ENDOCRINE: No heat or cold intolerance; No hair loss  MUSCULOSKELETAL: No joint pain or swelling; No muscle, back, or extremity pain  PSYCHIATRIC: No depression, anxiety, mood swings, or difficulty sleeping  HEME/LYMPH: No easy bruising, or bleeding gums  ALLERGY AND IMMUNOLOGIC: No hives or eczema    Vital Signs Last 24 Hrs  T(C): 36.3 (18 Dec 2020 11:59), Max: 37.1 (17 Dec 2020 17:03)  T(F): 97.3 (18 Dec 2020 11:59), Max: 98.8 (17 Dec 2020 17:03)  HR: 59 (18 Dec 2020 11:59) (55 - 82)  BP: 104/66 (18 Dec 2020 11:59) (95/55 - 123/70)  BP(mean): --  RR: 16 (18 Dec 2020 11:59) (16 - 18)  SpO2: 99% (18 Dec 2020 11:59) (98% - 99%)    PHYSICAL EXAM:  GENERAL: NAD, well-groomed, well-developed  HEAD:  Atraumatic, Normocephalic  EYES: EOMI, PERRLA, conjunctiva and sclera clear  NECK: Supple, No JVD, Normal thyroid  NERVOUS SYSTEM:  Alert & Oriented X3, Good concentration; Motor Strength 5/5 B/L upper and lower extremities;   CHEST/LUNG: Clear to percussion bilaterally; No rales, rhonchi, wheezing, or rubs  HEART: Regular rate and rhythm; No murmurs, rubs, or gallops  ABDOMEN: Soft, Nontender, Nondistended; Bowel sounds present  EXTREMITIES:  2+ Peripheral Pulses, No clubbing, cyanosis, or edema  LYMPH: No lymphadenopathy noted  SKIN: No rashes or lesions    LAB                          14.5   3.82  )-----------( 170      ( 18 Dec 2020 07:52 )             44.1       CBC:   @ 07:52  WBC 3.82   Hgb 14.5   Hct 44.1   Plts 170  MCV 81.8   @ 04:36  WBC 3.71   Hgb 14.7   Hct 44.3   Plts 134  MCV 82.0   @ 13:10  WBC 4.12   Hgb 15.2   Hct 46.1   Plts 126  MCV 82.0      Chemistry:   @ 07:52  Na+ 133  K+ 3.3  Cl- 100  CO2 25  BUN 9  Cr 1.57      @ 04:36  Na+ 137  K+ 3.4  Cl- 102  CO2 27  BUN 12  Cr 1.66      13:10  Na+ 133  K+ 3.2  Cl- 99  CO2 25  BUN 13  Cr 1.88         Glucose, Serum: 81 mg/dL ( @ 07:52)  Glucose, Serum: 95 mg/dL ( @ 04:36)  Glucose, Serum: 129 mg/dL ( @ 13:10)      18 Dec 2020 07:52    133    |  100    |  9      ----------------------------<  81     3.3     |  25     |  1.57   17 Dec 2020 04:36    137    |  102    |  12     ----------------------------<  95     3.4     |  27     |  1.66   16 Dec 2020 13:10    133    |  99     |  13     ----------------------------<  129    3.2     |  25     |  1.88     Ca    9.1        18 Dec 2020 07:52  Ca    8.4        17 Dec 2020 04:36  Ca    8.6        16 Dec 2020 13:10  Phos  2.5       18 Dec 2020 07:52  Phos  2.6       17 Dec 2020 04:36  Mg     2.1       18 Dec 2020 07:52  Mg     1.8       17 Dec 2020 04:36    TPro  7.7    /  Alb  3.3    /  TBili  0.7    /  DBili  x      /  AST  61     /  ALT  47     /  AlkPhos  43     16 Dec 2020 13:10          Urinalysis Basic - ( 16 Dec 2020 16:49 )    Color: Yellow / Appearance: Clear / S.005 / pH: x  Gluc: x / Ketone: Negative  / Bili: Negative / Urobili: Negative mg/dL   Blood: x / Protein: 30 mg/dL / Nitrite: Negative   Leuk Esterase: Negative / RBC: 0-2 /HPF / WBC x   Sq Epi: x / Non Sq Epi: Few / Bacteria: x        CAPILLARY BLOOD GLUCOSE          C-Reactive Protein, Serum: 17.91 mg/dL (12-18 @ 11:35)  C-Reactive Protein, Serum: 21.64 mg/dL (12-17 @ 11:05)      RADIOLOGY & ADDITIONAL TESTS:    Imaging Personally Reviewed:  [ ] YES  [ ] NO    Consultant(s) Notes Reviewed:  [ ] YES  [ ] NO    Care Discussed with Consultants/Other Providers [ ] YES  [ ] NO
Patient seen and examined at bedside resting comfortably.  Offers no complaints. States his pain is well controlled.   Currently febrile, received Tylenol.  NPO. Admits to a BM around 5AM.   Denies chills, N/V/D, CP, SOB.    Vital Signs Last 24 Hrs  T(F): 100.5 (12-17-20 @ 05:23), Max: 101.2 (12-16-20 @ 19:49)  HR: 100 (12-17-20 @ 05:23)  BP: 108/66 (12-17-20 @ 05:23)  RR: 18 (12-17-20 @ 05:23)  SpO2: 96% (12-17-20 @ 05:23)    PHYSICAL EXAM:  GENERAL: Alert, NAD  CHEST/LUNG: Clear to auscultation bilaterally, respirations nonlabored  HEART: Regular rate and rhythm; S1 & S2 appreciated  ABDOMEN: hypoactive BS, soft, nontender, nondistended  EXTREMITIES:  no calf tenderness, no edema    I&O's Detail      LABS:                        14.7   3.71  )-----------( 134      ( 17 Dec 2020 04:36 )             44.3     12-17    137  |  102  |  12  ----------------------------<  95  3.4<L>   |  27  |  1.66<H>    Ca    8.4<L>      17 Dec 2020 04:36  Phos  2.6     12-17  Mg     1.8     12-17    TPro  7.7  /  Alb  3.3  /  TBili  0.7  /  DBili  x   /  AST  61<H>  /  ALT  47  /  AlkPhos  43  12-16    PT/INR - ( 16 Dec 2020 13:10 )   PT: 12.2 sec;   INR: 1.05 ratio    PTT - ( 16 Dec 2020 13:10 )  PTT:28.6 sec    ASSESSMENT  41yo M with h/o Laparoscopic umbilical hernia rpr with mesh (3yrs ago) and no sig. PMH presents c/o RLQ abdominal pain and diarrhea x3 days. Admitted with Terminal Ileitis with inflammatory stranding around the tip of the appendix which may be reactive vs tip appendicitis. SUZETTE, hypokalemic.  Febrile.     PLAN  - continue cipro/flagyl  - NPO; continue IVF  - rpt lactate 1.1  - Strict I/Os, monitor and record all urine output  - f/u ESR/CRP; f/u GI consult (Dr. Hong will see pt in AM)  - antipyretics PRN  - serial abd exams  - analgesia PRN, antiemetic PRN  - DVT ppx, GI ppx  - potassium repleted; replete lytes PRN  - will discuss with Dr. Lombardo.

## 2020-12-18 NOTE — DISCHARGE NOTE PROVIDER - NSDCCPCAREPLAN_GEN_ALL_CORE_FT
PRINCIPAL DISCHARGE DIAGNOSIS  Diagnosis: Terminal ileitis  Assessment and Plan of Treatment: r/o appendicitis

## 2020-12-18 NOTE — DISCHARGE NOTE PROVIDER - HOSPITAL COURSE
39yo M with no PMH and h/o Laparoscopic Umbilical hernia repair with mesh (3yrs ago) presents to the ER c/o abdominal pain and diarrhea x3 days. Patient states RLQ abdominal pain and diarrhea began 3 days ago after eating a medium-rare steak and drinking "more alcohol than the usual" the night before. States pain has been severe and intermittent since it started, non radiating, rated as 9/10 in severity at its worst and feels better when he lays down. Denies taking anything for pain at home. Associated with nausea, and anorexia, denies vomiting. Has been drinking Gatorade x3 days and unable to eat. Also c/o watery nonbloody diarrhea x3 days (about 15-20 episodes/day). Denies h/o similar incident, states he thought he had food poisoning, but pain and diarrhea has not improved, prompting his arrival to the ER. States he took a COVID test which was negative.   Denies fevers, chest pain, sob, dizziness, dysuria, h/o constipation, Crohn's or UC. Patient was admitted with Terminal Ileitis with inflammatory stranding around the tip of the appendix which may be reactive vs tip appendicitis. Found with SUZETTE, and was hypokalemic and hyponatremic.    Patient admitted to surgical service, and Dr. Hong (GI) consulted. Started on IV antibiotics, IV fluids and given pain medication and antiemetics as needed. Patient received Tylenol as needed for a low grade fever on 12/16 and 12/17. Electrolyte abnormalities repleted as needed. Diet advanced as tolerated, no nausea/vomiting with food. Diarrhea persisted throughout admission but with improved abdominal pain. Stool studies positive for Salmonella, no C. Difficile.  Antibiotics discontinued prior to discharge due to self resolving nature of Salmonella in healthy patients with nonsevere infections.    Stable for discharge from surgical standpoint. On discharge, continue to drink plenty of fluids, such as water and Gatorade. Continue a low fiber diet. Please follow up with Dr. Hong (gastroenterologist) in 1 week. 41yo M with no PMH and h/o Laparoscopic Umbilical hernia repair with mesh (3yrs ago) presents to the ER c/o abdominal pain and diarrhea x3 days. Patient states RLQ abdominal pain and diarrhea began 3 days ago after eating a medium-rare steak and drinking "more alcohol than the usual" the night before. States pain has been severe and intermittent since it started, non radiating, rated as 9/10 in severity at its worst and feels better when he lays down. Denies taking anything for pain at home. Associated with nausea, and anorexia, denies vomiting. Has been drinking Gatorade x3 days and unable to eat. Also c/o watery nonbloody diarrhea x3 days (about 15-20 episodes/day). Denies h/o similar incident, states he thought he had food poisoning, but pain and diarrhea has not improved, prompting his arrival to the ER. States he took a COVID test which was negative. Denies fevers, chest pain, sob, dizziness, dysuria, h/o constipation, Crohn's or UC. Patient was admitted with Terminal Ileitis with inflammatory stranding around the tip of the appendix which may be reactive vs tip appendicitis. Found with SUZETTE, and was hypokalemic and hyponatremic.    Patient admitted to surgical service, and Dr. Hong (GI) consulted. Started on IV antibiotics, IV fluids and given pain medication and antiemetics as needed. Patient received Tylenol as needed for a low grade fever on 12/16 and 12/17. Electrolyte abnormalities repleted as needed. Diet advanced as tolerated, no nausea/vomiting with food. Diarrhea persisted throughout admission but with improved abdominal pain. Stool studies positive for Salmonella, no C. Difficile. Patient transitioned to PO antibiotics prior to discharge.     Stable for discharge from surgical standpoint. On discharge, please take prescribed antibiotic twice a day for 3 days and prescribed probiotic once a day for 30 days. Continue to drink plenty of fluids, such as water and Gatorade and continue a low fiber diet. Please follow up with Dr. Hong (gastroenterologist) in 1 week.

## 2020-12-18 NOTE — DISCHARGE NOTE NURSING/CASE MANAGEMENT/SOCIAL WORK - PATIENT PORTAL LINK FT
You can access the FollowMyHealth Patient Portal offered by [facility] by registering at the following website: [email or web address]. By joining [facility]’s FollowMyHealth portal, you will also be able to view your health information using other applications (apps) compatible with our system.

## 2020-12-19 VITALS
TEMPERATURE: 97 F | DIASTOLIC BLOOD PRESSURE: 73 MMHG | OXYGEN SATURATION: 98 % | RESPIRATION RATE: 18 BRPM | SYSTOLIC BLOOD PRESSURE: 115 MMHG | HEART RATE: 58 BPM

## 2020-12-19 LAB
CULTURE RESULTS: SIGNIFICANT CHANGE UP
CULTURE RESULTS: SIGNIFICANT CHANGE UP
SPECIMEN SOURCE: SIGNIFICANT CHANGE UP
SPECIMEN SOURCE: SIGNIFICANT CHANGE UP

## 2020-12-19 RX ADMIN — DEXTROSE MONOHYDRATE, SODIUM CHLORIDE, AND POTASSIUM CHLORIDE 75 MILLILITER(S): 50; .745; 4.5 INJECTION, SOLUTION INTRAVENOUS at 02:23

## 2020-12-19 RX ADMIN — HEPARIN SODIUM 5000 UNIT(S): 5000 INJECTION INTRAVENOUS; SUBCUTANEOUS at 05:16

## 2020-12-19 RX ADMIN — Medication 650 MILLIGRAM(S): at 05:17

## 2020-12-19 RX ADMIN — Medication 650 MILLIGRAM(S): at 06:46

## 2020-12-19 RX ADMIN — Medication 650 MILLIGRAM(S): at 02:30

## 2020-12-19 RX ADMIN — PANTOPRAZOLE SODIUM 40 MILLIGRAM(S): 20 TABLET, DELAYED RELEASE ORAL at 11:31

## 2020-12-19 RX ADMIN — Medication 650 MILLIGRAM(S): at 00:08

## 2020-12-19 RX ADMIN — Medication 500 MILLIGRAM(S): at 05:16

## 2020-12-23 DIAGNOSIS — R50.9 FEVER, UNSPECIFIED: ICD-10-CM

## 2020-12-23 DIAGNOSIS — E87.1 HYPO-OSMOLALITY AND HYPONATREMIA: ICD-10-CM

## 2020-12-23 DIAGNOSIS — A02.8: ICD-10-CM

## 2020-12-23 DIAGNOSIS — Z53.9 PROCEDURE AND TREATMENT NOT CARRIED OUT, UNSPECIFIED REASON: ICD-10-CM

## 2020-12-23 DIAGNOSIS — E87.6 HYPOKALEMIA: ICD-10-CM

## 2020-12-23 DIAGNOSIS — K37 UNSPECIFIED APPENDICITIS: ICD-10-CM

## 2020-12-23 DIAGNOSIS — R18.8 OTHER ASCITES: ICD-10-CM

## 2020-12-23 DIAGNOSIS — N17.9 ACUTE KIDNEY FAILURE, UNSPECIFIED: ICD-10-CM

## 2020-12-23 DIAGNOSIS — R19.7 DIARRHEA, UNSPECIFIED: ICD-10-CM

## 2021-08-19 PROBLEM — Z78.9 OTHER SPECIFIED HEALTH STATUS: Chronic | Status: ACTIVE | Noted: 2020-12-16

## 2021-08-26 ENCOUNTER — APPOINTMENT (OUTPATIENT)
Dept: PULMONOLOGY | Facility: CLINIC | Age: 41
End: 2021-08-26
Payer: COMMERCIAL

## 2021-08-26 VITALS
TEMPERATURE: 96.8 F | WEIGHT: 235 LBS | OXYGEN SATURATION: 97 % | SYSTOLIC BLOOD PRESSURE: 108 MMHG | HEIGHT: 72 IN | DIASTOLIC BLOOD PRESSURE: 62 MMHG | HEART RATE: 70 BPM | BODY MASS INDEX: 31.83 KG/M2

## 2021-08-26 DIAGNOSIS — I26.99 OTHER PULMONARY EMBOLISM W/OUT ACUTE COR PULMONALE: ICD-10-CM

## 2021-08-26 DIAGNOSIS — Z86.711 PERSONAL HISTORY OF PULMONARY EMBOLISM: ICD-10-CM

## 2021-08-26 DIAGNOSIS — Z78.9 OTHER SPECIFIED HEALTH STATUS: ICD-10-CM

## 2021-08-26 DIAGNOSIS — R07.9 CHEST PAIN, UNSPECIFIED: ICD-10-CM

## 2021-08-26 PROBLEM — Z00.00 ENCOUNTER FOR PREVENTIVE HEALTH EXAMINATION: Status: ACTIVE | Noted: 2021-08-26

## 2021-08-26 PROCEDURE — 99202 OFFICE O/P NEW SF 15 MIN: CPT | Mod: 25

## 2021-08-26 PROCEDURE — 94727 GAS DIL/WSHOT DETER LNG VOL: CPT

## 2021-08-26 PROCEDURE — 94060 EVALUATION OF WHEEZING: CPT

## 2021-08-26 PROCEDURE — 94729 DIFFUSING CAPACITY: CPT

## 2021-08-26 RX ORDER — APIXABAN 5 MG/1
5 TABLET, FILM COATED ORAL
Refills: 0 | Status: ACTIVE | COMMUNITY
Start: 2021-08-26

## 2021-08-26 NOTE — HISTORY OF PRESENT ILLNESS
[Never] : never [TextBox_4] : 41 yo male with hx of recent PE presents for evaluation. The patient complained of pain on deep breathing last week without cough, SOB or hemoptysis, seen in walk in clinic, subsequently referred to hospital because of "abnormal blood tests". 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 There was no evidence of right heart strain. He was  discharged on eliquis with recommendation to follow up with pulmonary. He denies recent long travel, trauma but states that he had PE 12 years ago while in [place], treated for a short period of time without follow up or evaluation after. He is covid 19 vaccinated and swab negative. [TextBox_29] : Denies snoring, daytime somnolence, apneic episodes, AM headaches

## 2021-08-26 NOTE — DISCUSSION/SUMMARY
[FreeTextEntry1] : 39 yo male with hx of PE with recent recurrence.I had a very long discussion with the patient regarding the significance of recurrence of the problem and need for continued DOAC treatment, likely lifelong. Heme evaluation was also stressed and was referred to Dr. Bah. I reviewed the PFT results with him. PRN albuterol use in the future is indicated if appropriate respiratory symptoms are present. 2D echo cardiogram was recommended despite lack of right heart strain on CTPA. Follow up with a primary care physician for overall medical care was recommended.
